# Patient Record
Sex: FEMALE | Race: WHITE | NOT HISPANIC OR LATINO | URBAN - METROPOLITAN AREA
[De-identification: names, ages, dates, MRNs, and addresses within clinical notes are randomized per-mention and may not be internally consistent; named-entity substitution may affect disease eponyms.]

---

## 2017-12-08 ENCOUNTER — CARE COORDINATION (OUTPATIENT)
Dept: PEDIATRIC CARDIOLOGY | Facility: CLINIC | Age: 22
End: 2017-12-08

## 2017-12-08 ENCOUNTER — MEDICAL CORRESPONDENCE (OUTPATIENT)
Dept: HEALTH INFORMATION MANAGEMENT | Facility: CLINIC | Age: 22
End: 2017-12-08

## 2017-12-08 DIAGNOSIS — I49.9 IRREGULAR HEARTBEAT: Primary | ICD-10-CM

## 2017-12-08 DIAGNOSIS — R63.4 WEIGHT LOSS: ICD-10-CM

## 2017-12-08 DIAGNOSIS — F50.9 EATING DISORDER: ICD-10-CM

## 2017-12-08 NOTE — PROGRESS NOTES
Dr. Rafia Rowland's assistant was calling to see if there were results on Anisha's outpt EKG done earlier this AM for irregular heart beat, weight loss and eating disorder.    This EKG was sent to Dr. Roman who is aware of her situation and said the EKG was normal.  No cardiology f/u needed on the EKG.    This information was given to the assistant. The official EKG will be faxed when it is entered into our system on Monday.  She verbalized understanding and has the nurse triage line if there are any other questions or concerns.    Ember Gamboa, RN Care Coordinator  [facility]

## 2017-12-09 LAB — INTERPRETATION ECG - MUSE: NORMAL

## 2017-12-21 DIAGNOSIS — R63.4 LOSS OF WEIGHT: ICD-10-CM

## 2017-12-21 DIAGNOSIS — R63.0 ANOREXIA: Primary | ICD-10-CM

## 2017-12-21 LAB — INTERPRETATION ECG - MUSE: NORMAL

## 2017-12-29 ENCOUNTER — HOSPITAL ENCOUNTER (EMERGENCY)
Facility: CLINIC | Age: 22
Discharge: HOME | End: 2017-12-29
Attending: EMERGENCY MEDICINE | Admitting: EMERGENCY MEDICINE
Payer: COMMERCIAL

## 2017-12-29 VITALS
SYSTOLIC BLOOD PRESSURE: 115 MMHG | HEART RATE: 67 BPM | DIASTOLIC BLOOD PRESSURE: 66 MMHG | WEIGHT: 123.46 LBS | OXYGEN SATURATION: 100 % | RESPIRATION RATE: 16 BRPM | TEMPERATURE: 98.2 F

## 2017-12-29 DIAGNOSIS — K21.9 GASTROESOPHAGEAL REFLUX DISEASE WITHOUT ESOPHAGITIS: ICD-10-CM

## 2017-12-29 LAB
ANION GAP SERPL CALCULATED.3IONS-SCNC: 6 MMOL/L (ref 3–14)
BASOPHILS # BLD AUTO: 0 10E9/L (ref 0–0.2)
BASOPHILS NFR BLD AUTO: 0.6 %
BUN SERPL-MCNC: 18 MG/DL (ref 7–30)
CALCIUM SERPL-MCNC: 9 MG/DL (ref 8.5–10.1)
CHLORIDE SERPL-SCNC: 105 MMOL/L (ref 94–109)
CO2 SERPL-SCNC: 29 MMOL/L (ref 20–32)
CREAT SERPL-MCNC: 1.02 MG/DL (ref 0.52–1.04)
DIFFERENTIAL METHOD BLD: NORMAL
EOSINOPHIL # BLD AUTO: 0.1 10E9/L (ref 0–0.7)
EOSINOPHIL NFR BLD AUTO: 2.1 %
ERYTHROCYTE [DISTWIDTH] IN BLOOD BY AUTOMATED COUNT: 13.7 % (ref 10–15)
GFR SERPL CREATININE-BSD FRML MDRD: 69 ML/MIN/1.7M2
GLUCOSE SERPL-MCNC: 85 MG/DL (ref 70–99)
HCT VFR BLD AUTO: 38.8 % (ref 35–47)
HGB BLD-MCNC: 12.6 G/DL (ref 11.7–15.7)
IMM GRANULOCYTES # BLD: 0 10E9/L (ref 0–0.4)
IMM GRANULOCYTES NFR BLD: 0.2 %
LYMPHOCYTES # BLD AUTO: 2.4 10E9/L (ref 0.8–5.3)
LYMPHOCYTES NFR BLD AUTO: 36 %
MAGNESIUM SERPL-MCNC: 2.2 MG/DL (ref 1.6–2.3)
MCH RBC QN AUTO: 29.8 PG (ref 26.5–33)
MCHC RBC AUTO-ENTMCNC: 32.5 G/DL (ref 31.5–36.5)
MCV RBC AUTO: 92 FL (ref 78–100)
MONOCYTES # BLD AUTO: 0.4 10E9/L (ref 0–1.3)
MONOCYTES NFR BLD AUTO: 5.7 %
NEUTROPHILS # BLD AUTO: 3.7 10E9/L (ref 1.6–8.3)
NEUTROPHILS NFR BLD AUTO: 55.4 %
NRBC # BLD AUTO: 0 10*3/UL
NRBC BLD AUTO-RTO: 0 /100
PHOSPHATE SERPL-MCNC: 3.5 MG/DL (ref 2.5–4.5)
PLATELET # BLD AUTO: 246 10E9/L (ref 150–450)
POTASSIUM SERPL-SCNC: 3.8 MMOL/L (ref 3.4–5.3)
RBC # BLD AUTO: 4.23 10E12/L (ref 3.8–5.2)
SODIUM SERPL-SCNC: 140 MMOL/L (ref 133–144)
TROPONIN I SERPL-MCNC: <0.015 UG/L (ref 0–0.04)
WBC # BLD AUTO: 6.6 10E9/L (ref 4–11)

## 2017-12-29 PROCEDURE — 99284 EMERGENCY DEPT VISIT MOD MDM: CPT | Mod: 25 | Performed by: EMERGENCY MEDICINE

## 2017-12-29 PROCEDURE — 83735 ASSAY OF MAGNESIUM: CPT | Performed by: EMERGENCY MEDICINE

## 2017-12-29 PROCEDURE — 25000125 ZZHC RX 250: Performed by: EMERGENCY MEDICINE

## 2017-12-29 PROCEDURE — 84100 ASSAY OF PHOSPHORUS: CPT | Performed by: EMERGENCY MEDICINE

## 2017-12-29 PROCEDURE — 93010 ELECTROCARDIOGRAM REPORT: CPT | Mod: Z6 | Performed by: EMERGENCY MEDICINE

## 2017-12-29 PROCEDURE — 99284 EMERGENCY DEPT VISIT MOD MDM: CPT | Performed by: EMERGENCY MEDICINE

## 2017-12-29 PROCEDURE — 25000132 ZZH RX MED GY IP 250 OP 250 PS 637: Performed by: EMERGENCY MEDICINE

## 2017-12-29 PROCEDURE — 84484 ASSAY OF TROPONIN QUANT: CPT | Performed by: EMERGENCY MEDICINE

## 2017-12-29 PROCEDURE — 80048 BASIC METABOLIC PNL TOTAL CA: CPT | Performed by: EMERGENCY MEDICINE

## 2017-12-29 PROCEDURE — 85025 COMPLETE CBC W/AUTO DIFF WBC: CPT | Performed by: EMERGENCY MEDICINE

## 2017-12-29 PROCEDURE — 93005 ELECTROCARDIOGRAM TRACING: CPT | Performed by: EMERGENCY MEDICINE

## 2017-12-29 RX ORDER — PANTOPRAZOLE SODIUM 40 MG/1
40 TABLET, DELAYED RELEASE ORAL DAILY
Qty: 30 TABLET | Refills: 0 | Status: SHIPPED | OUTPATIENT
Start: 2017-12-29 | End: 2018-01-28

## 2017-12-29 RX ADMIN — LIDOCAINE HYDROCHLORIDE 30 ML: 20 SOLUTION ORAL; TOPICAL at 22:34

## 2017-12-29 ASSESSMENT — ENCOUNTER SYMPTOMS
FEVER: 0
ABDOMINAL PAIN: 0
SHORTNESS OF BREATH: 0

## 2017-12-29 NOTE — ED AVS SNAPSHOT
[facility], Emergency Department    [address] [facility] [place] [address]    Phone:  [phone]                                       Anisha Garcia   MRN: [id number]    Department:  [facility], Emergency Department   Date of Visit:  12/29/2017           Patient Information     Date Of Birth          1995        Your diagnoses for this visit were:     Gastroesophageal reflux disease without esophagitis        You were seen by Ender Sanchez MD.        Discharge Instructions       Please make an appointment to follow up with Your Primary Care Provider as soon as possible if you have any concerns.  Results for orders placed or performed during the hospital encounter of 12/29/17   Basic metabolic panel   Result Value Ref Range    Sodium 140 133 - 144 mmol/L    Potassium 3.8 3.4 - 5.3 mmol/L    Chloride 105 94 - 109 mmol/L    Carbon Dioxide 29 20 - 32 mmol/L    Anion Gap 6 3 - 14 mmol/L    Glucose 85 70 - 99 mg/dL    Urea Nitrogen 18 7 - 30 mg/dL    Creatinine 1.02 0.52 - 1.04 mg/dL    GFR Estimate 69 >60 mL/min/1.7m2    GFR Estimate If Black 83 >60 mL/min/1.7m2    Calcium 9.0 8.5 - 10.1 mg/dL   CBC with platelets differential   Result Value Ref Range    WBC 6.6 4.0 - 11.0 10e9/L    RBC Count 4.23 3.8 - 5.2 10e12/L    Hemoglobin 12.6 11.7 - 15.7 g/dL    Hematocrit 38.8 35.0 - 47.0 %    MCV 92 78 - 100 fl    MCH 29.8 26.5 - 33.0 pg    MCHC 32.5 31.5 - 36.5 g/dL    RDW 13.7 10.0 - 15.0 %    Platelet Count 246 150 - 450 10e9/L    Diff Method Automated Method     % Neutrophils 55.4 %    % Lymphocytes 36.0 %    % Monocytes 5.7 %    % Eosinophils 2.1 %    % Basophils 0.6 %    % Immature Granulocytes 0.2 %    Nucleated RBCs 0 0 /100    Absolute Neutrophil 3.7 1.6 - 8.3 10e9/L    Absolute Lymphocytes 2.4 0.8 - 5.3 10e9/L    Absolute Monocytes 0.4 0.0 - 1.3 10e9/L    Absolute Eosinophils 0.1 0.0 - 0.7 10e9/L    Absolute Basophils 0.0 0.0 - 0.2 10e9/L    Abs Immature Granulocytes 0.0 0 - 0.4 10e9/L     Absolute Nucleated RBC 0.0    Magnesium   Result Value Ref Range    Magnesium 2.2 1.6 - 2.3 mg/dL   Phosphorus   Result Value Ref Range    Phosphorus 3.5 2.5 - 4.5 mg/dL   Troponin I   Result Value Ref Range    Troponin I ES <0.015 0.000 - 0.045 ug/L         GERD (Adult)    The esophagus is a tube that carries food from the mouth to the stomach. A valve at the lower end of the esophagus prevents stomach acid from flowing upward. When this valve doesn't work properly, stomach contents may repeatedly flow back up (reflux) into the esophagus. This is called gastroesophageal reflux disease (GERD). GERD can irritate the esophagus. It can cause problems with swallowing or breathing. In severe cases, GERD can cause recurrent pneumonia or other serious problems.  Symptoms of reflux include burning, pressure or sharp pain in the upper abdomen or mid to lower chest. The pain can spread to the neck, back, or shoulder. There may be belching, an acid taste in the back of the throat, chronic cough, or sore throat or hoarseness. GERD symptoms often occur during the day after a big meal. They can also occur at night when lying down.   Home care  Lifestyle changes can help reduce symptoms. If needed, medicines may be prescribed. Symptoms often improve with treatment, but if treatment is stopped, the symptoms often return after a few months. So most persons with GERD will need to continue treatment.  Lifestyle changes    Limit or avoid fatty, fried, and spicy foods, as well as coffee, chocolate, mint, and foods with high acid content such as tomatoes and citrus fruit and juices (orange, grapefruit, lemon).    Don t eat large meals, especially at night. Frequent, smaller meals are best. Do not lie down right after eating. And don t eat anything 3 hours before going to bed.    Avoid drinking alcohol and smoking. As much as possible, stay away from second hand smoke.    If you are overweight, losing weight will reduce  "symptoms.     Avoid wearing tight clothing around your stomach area.    If your symptoms occur during sleep, use a foam wedge to elevate your upper body (not just your head.) Or, place 4\" blocks under the head of your bed.  Medicines  If needed, medicines can help relieve the symptoms of GERD and prevent damage to the esophagus. Discuss a medicine plan with your healthcare provider. This may include one or more of the following medicines:    Antacids to help neutralize the normal acids in your stomach.    Acid blockers (H2 blockers) to decrease acid production.    Acid inhibitors (PPIs) to decrease acid production in a different way than the blockers. They may work better, but can take a little longer to take effect.  Take an antacid 30-60 minutes after eating and at bedtime, but not at the same time as an acid blocker.  Try not to take medicines such as ibuprofen and aspirin. If you are taking aspirin for your heart or other medical reasons, talk to your healthcare provider about stopping it.  Follow-up care  Follow up with your healthcare provider or as advised by our staff.  When to seek medical advice  Call your healthcare provider if any of the following occur:    Stomach pain gets worse or moves to the lower right abdomen (appendix area)    Chest pain appears or gets worse, or spreads to the back, neck, shoulder, or arm    Frequent vomiting (can t keep down liquids)    Blood in the stool or vomit (red or black in color)    Feeling weak or dizzy    Fever of 100.4 F (38 C) or higher, or as directed by your healthcare provider  Date Last Reviewed: 6/23/2015 2000-2017 [employer]. [address] [place] [address], [place] [address]. All rights reserved. This information is not intended as a substitute for professional medical care. Always follow your healthcare professional's instructions.          24 Hour Appointment Hotline       To make an appointment at any [facility], call [phone] ([phone]). If you don't have a family doctor or clinic, we will help you find one. [facility] are conveniently located to serve the needs of you and your family.             Review of your medicines      START taking        Dose / Directions Last dose taken    pantoprazole 40 MG EC tablet   Commonly known as:  PROTONIX   Dose:  40 mg   Quantity:  30 tablet        Take 1 tablet (40 mg) by mouth daily for 30 doses   Refills:  0          Our records show that you are taking the medicines listed below. If these are incorrect, please call your family doctor or clinic.        Dose / Directions Last dose taken    albuterol 90 MCG/ACT inhaler   Dose:  2 puff        Inhale 2 puffs into the lungs every 6 hours as needed.   Refills:  0        BUSPAR PO   Dose:  5 mg        Take 5 mg by mouth 2 times daily   Refills:  0        DESOGEN 0.15-30 MG-MCG per tablet   Dose:  1 tablet   Generic drug:  desogestrel-ethinyl estradiol        Take 1 tablet by mouth daily.   Refills:  0        MIRTAZAPINE PO   Dose:  15 mg        Take 15 mg by mouth At Bedtime   Refills:  0        WELLBUTRIN XL PO   Dose:  300 mg        Take 300 mg by mouth daily   Refills:  0                Prescriptions were sent or printed at these locations (1 Prescription)                   Other Prescriptions                Printed at Department/Unit printer (1 of 1)         pantoprazole (PROTONIX) 40 MG EC tablet                Procedures and tests performed during your visit     Basic metabolic panel    CBC with platelets differential    EKG 12 lead    Magnesium    Phosphorus    Troponin I      Orders Needing Specimen Collection     None      Pending Results     No orders found from 12/27/2017 to 12/30/2017.            Pending Culture Results     No orders found from 12/27/2017 to 12/30/2017.            Pending Results Instructions     If you had any lab results that were not finalized at the time of your Discharge, you can call the ED Lab Result RN at  "[phone]. You will be contacted by this team for any positive Lab results or changes in treatment. The nurses are available 7 days a week from 10A to 6:30P.  You can leave a message 24 hours per day and they will return your call.        Thank you for choosing [facility]       Thank you for choosing [facility] for your care. Our goal is always to provide you with excellent care. Hearing back from our patients is one way we can continue to improve our services. Please take a few minutes to complete the written survey that you may receive in the mail after you visit with us. Thank you!        [employer] Information     [employer] lets you send messages to your doctor, view your test results, renew your prescriptions, schedule appointments and more. To sign up, go to [email or web address] . Click on \"Log in\" on the left side of the screen, which will take you to the Welcome page. Then click on \"Sign up Now\" on the right side of the page.     You will be asked to enter the access code listed below, as well as some personal information. Please follow the directions to create your username and password.     Your access code is: [email or web address]  Expires: 3/29/2018 11:14 PM     Your access code will  in 90 days. If you need help or a new code, please call your [facility] clinic or [phone].        Care EveryWhere ID     This is your Care EveryWhere ID. This could be used by other organizations to access your [facility] medical records  [id number]        Equal Access to Services     CORDELIA RUSH : Hadii heidi sutherlando Sofranko, waaxda luqadaha, qaybta kaalmada sadia, angely mera . So [facility] [phone].    ATENCIÓN: Si habla español, tiene a greer disposición servicios gratuitos de asistencia lingüística. Llame al [phone].    We comply with applicable federal civil rights laws and [place] laws. We do not discriminate on the basis of race, color, national origin, age, disability, sex, " sexual orientation, or gender identity.            After Visit Summary       This is your record. Keep this with you and show to your community pharmacist(s) and doctor(s) at your next visit.

## 2017-12-29 NOTE — ED AVS SNAPSHOT
[facility], [place], Emergency Department    [address] [place] [address]    Phone:  [phone]                                       Anisha Garcia   MRN: [id number]    Department:  [facility], Emergency Department   Date of Visit:  12/29/2017           After Visit Summary Signature Page     I have received my discharge instructions, and my questions have been answered. I have discussed any challenges I see with this plan with the nurse or doctor.    ..........................................................................................................................................  Patient/Patient Representative Signature      ..........................................................................................................................................  Patient Representative Print Name and Relationship to Patient    ..................................................               ................................................  Date                                            Time    ..........................................................................................................................................  Reviewed by Signature/Title    ...................................................              ..............................................  Date                                                            Time

## 2017-12-30 NOTE — ED NOTES
Patient arrived from the [facility] for chest pain she has had since yesterday. Pt denies any history of cardiac problems.

## 2017-12-30 NOTE — DISCHARGE INSTRUCTIONS
Please make an appointment to follow up with Your Primary Care Provider as soon as possible if you have any concerns.  Results for orders placed or performed during the hospital encounter of 12/29/17   Basic metabolic panel   Result Value Ref Range    Sodium 140 133 - 144 mmol/L    Potassium 3.8 3.4 - 5.3 mmol/L    Chloride 105 94 - 109 mmol/L    Carbon Dioxide 29 20 - 32 mmol/L    Anion Gap 6 3 - 14 mmol/L    Glucose 85 70 - 99 mg/dL    Urea Nitrogen 18 7 - 30 mg/dL    Creatinine 1.02 0.52 - 1.04 mg/dL    GFR Estimate 69 >60 mL/min/1.7m2    GFR Estimate If Black 83 >60 mL/min/1.7m2    Calcium 9.0 8.5 - 10.1 mg/dL   CBC with platelets differential   Result Value Ref Range    WBC 6.6 4.0 - 11.0 10e9/L    RBC Count 4.23 3.8 - 5.2 10e12/L    Hemoglobin 12.6 11.7 - 15.7 g/dL    Hematocrit 38.8 35.0 - 47.0 %    MCV 92 78 - 100 fl    MCH 29.8 26.5 - 33.0 pg    MCHC 32.5 31.5 - 36.5 g/dL    RDW 13.7 10.0 - 15.0 %    Platelet Count 246 150 - 450 10e9/L    Diff Method Automated Method     % Neutrophils 55.4 %    % Lymphocytes 36.0 %    % Monocytes 5.7 %    % Eosinophils 2.1 %    % Basophils 0.6 %    % Immature Granulocytes 0.2 %    Nucleated RBCs 0 0 /100    Absolute Neutrophil 3.7 1.6 - 8.3 10e9/L    Absolute Lymphocytes 2.4 0.8 - 5.3 10e9/L    Absolute Monocytes 0.4 0.0 - 1.3 10e9/L    Absolute Eosinophils 0.1 0.0 - 0.7 10e9/L    Absolute Basophils 0.0 0.0 - 0.2 10e9/L    Abs Immature Granulocytes 0.0 0 - 0.4 10e9/L    Absolute Nucleated RBC 0.0    Magnesium   Result Value Ref Range    Magnesium 2.2 1.6 - 2.3 mg/dL   Phosphorus   Result Value Ref Range    Phosphorus 3.5 2.5 - 4.5 mg/dL   Troponin I   Result Value Ref Range    Troponin I ES <0.015 0.000 - 0.045 ug/L         GERD (Adult)    The esophagus is a tube that carries food from the mouth to the stomach. A valve at the lower end of the esophagus prevents stomach acid from flowing upward. When this valve doesn't work properly, stomach contents may repeatedly flow back  "up (reflux) into the esophagus. This is called gastroesophageal reflux disease (GERD). GERD can irritate the esophagus. It can cause problems with swallowing or breathing. In severe cases, GERD can cause recurrent pneumonia or other serious problems.  Symptoms of reflux include burning, pressure or sharp pain in the upper abdomen or mid to lower chest. The pain can spread to the neck, back, or shoulder. There may be belching, an acid taste in the back of the throat, chronic cough, or sore throat or hoarseness. GERD symptoms often occur during the day after a big meal. They can also occur at night when lying down.   Home care  Lifestyle changes can help reduce symptoms. If needed, medicines may be prescribed. Symptoms often improve with treatment, but if treatment is stopped, the symptoms often return after a few months. So most persons with GERD will need to continue treatment.  Lifestyle changes    Limit or avoid fatty, fried, and spicy foods, as well as coffee, chocolate, mint, and foods with high acid content such as tomatoes and citrus fruit and juices (orange, grapefruit, lemon).    Don t eat large meals, especially at night. Frequent, smaller meals are best. Do not lie down right after eating. And don t eat anything 3 hours before going to bed.    Avoid drinking alcohol and smoking. As much as possible, stay away from second hand smoke.    If you are overweight, losing weight will reduce symptoms.     Avoid wearing tight clothing around your stomach area.    If your symptoms occur during sleep, use a foam wedge to elevate your upper body (not just your head.) Or, place 4\" blocks under the head of your bed.  Medicines  If needed, medicines can help relieve the symptoms of GERD and prevent damage to the esophagus. Discuss a medicine plan with your healthcare provider. This may include one or more of the following medicines:    Antacids to help neutralize the normal acids in your stomach.    Acid blockers (H2 " blockers) to decrease acid production.    Acid inhibitors (PPIs) to decrease acid production in a different way than the blockers. They may work better, but can take a little longer to take effect.  Take an antacid 30-60 minutes after eating and at bedtime, but not at the same time as an acid blocker.  Try not to take medicines such as ibuprofen and aspirin. If you are taking aspirin for your heart or other medical reasons, talk to your healthcare provider about stopping it.  Follow-up care  Follow up with your healthcare provider or as advised by our staff.  When to seek medical advice  Call your healthcare provider if any of the following occur:    Stomach pain gets worse or moves to the lower right abdomen (appendix area)    Chest pain appears or gets worse, or spreads to the back, neck, shoulder, or arm    Frequent vomiting (can t keep down liquids)    Blood in the stool or vomit (red or black in color)    Feeling weak or dizzy    Fever of 100.4 F (38 C) or higher, or as directed by your healthcare provider  Date Last Reviewed: 6/23/2015 2000-2017 [employer]. [address] [place] [address], [place] [address]. All rights reserved. This information is not intended as a substitute for professional medical care. Always follow your healthcare professional's instructions.

## 2017-12-31 LAB — INTERPRETATION ECG - MUSE: NORMAL

## 2018-01-18 ENCOUNTER — RECORDS - HEALTHEAST (OUTPATIENT)
Dept: LAB | Facility: CLINIC | Age: 23
End: 2018-01-18

## 2018-01-18 LAB
FERRITIN SERPL-MCNC: 31 NG/ML (ref 10–130)
MAGNESIUM SERPL-MCNC: 2.2 MG/DL (ref 1.8–2.6)
PHOSPHATE SERPL-MCNC: 4 MG/DL (ref 2.5–4.5)

## 2018-11-16 ENCOUNTER — HOSPITAL ENCOUNTER (OUTPATIENT)
Dept: NUTRITION | Facility: CLINIC | Age: 23
Discharge: HOME | End: 2018-11-16
Admitting: PEDIATRICS
Payer: COMMERCIAL

## 2018-11-16 VITALS — WEIGHT: 153.8 LBS

## 2018-11-16 PROCEDURE — 97802 MEDICAL NUTRITION INDIV IN: CPT | Performed by: DIETITIAN, REGISTERED

## 2018-11-16 NOTE — PROGRESS NOTES
"OUTPATIENT NUTRITION ASSESSMENT  REASON FOR ASSESSMENT  Anisha Garcia referred by Dr. Rowland for MNT related to bradycardia, orthostatic hypotension.    Patient accompanied by self      ASSESSMENT   Nutrition History: - Information obtained from patient.  Patient has struggled with eating disorder for past 2 years.  Patient states she restricted as a way to slowly die.  Patient states she does not have any SI thoughts.  Patient denies laxatives, diet pills and diuretics.  Patient's compensatory behaviors include restriction and previously over exercise 30 minutes 3 times per day most days.  Patient does not typically weigh self but was giving plasma yesterday and was told her weight was 161 lbs.  Patient did have reaction to hearing her weight.  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  Patient does not feel it is triggering to work at restaurant.       Diet Recall:  Breakfast: 2 waffles with peanut butter, nutella and syrup and whipped cream + milk  Snack: may skip or 1/2 bagel with cream cheese, salmon and spinach  Lunch: bagel with cream cheese, turkey and onions   Snack: may skip or Jeremy bar and canned peaches  Dinner: pork chop with brussel sprout salad and waffle  Snack: usually skips   Snack: 1/2 bag  Beverages: water, coffee  Dining out: May dine out with friends or eats at restaurant where she works         Exercise: Patient does not currently exercise.  Patient renewed gym membership.  Patient states she does not have exercise restriction anymore.  RD called Dr. Rowland's office and spoke with Milena Cancino CMA to clarify if patient on exercise restriction.  NICK states there are no notes stating exercise restriction.     PHYSICAL FINDINGS  Observed:  No nutrition-related physical findings observed  Obtained from Chart/Interdisciplinary Team:  None noted    LABS  N/A    MEDICATIONS  Medications reviewed    ANTHROPOMETRICS   Height: 5'8\"  Weight: " 153.8 lbs  BMI (kg/m2): 23.3 kg/m2   Weight Status:  Normal BMI  %IBW: 109%  Weight History: Patient states her weight range goal is 150-160 lbs.  Wt Readings from Last 5 Encounters:   11/16/18 69.8 kg (153 lb 12.8 oz)   12/29/17 56 kg (123 lb 7.3 oz)   10/15/12 60.5 kg (133 lb 6.1 oz) (76 %)*     * Growth percentiles are based on [facility] 2-20 Years data.     ASSESSED NUTRITION NEEDS  Estimated Energy Needs: 7184-3218 kcals/day (25-30 Kcal/Kg)  Justification:  (maintenance)  Estimated Protein Needs: 70-83 grams protein/day (1-1.2 g pro/Kg)  Justification:  (maintenance)  Estimated Fluid Needs: 9570-9011 mL/day (30-35 mL/kg)    ASSESSED MALNUTRITION STATUS  % Weight Loss:  None noted  % Intake:  Decreased intake does not meet criteria for malnutrition   Subcutaneous Fat Loss:  None observed  Loss of Muscle Mass:  None observed  Fluid Retention:  None noted    Malnutrition Diagnosis:  Patient does not meet two of the above criteria necessary for diagnosing malnutrition    DIAGNOSIS   Nutrition Diagnosis:  Disordered eating pattern related to preoccupation with weight as evidenced by restricted eating behaviors and previous over exercising       INTERVENTIONS   Nutrition Prescription   Normalized eating pattern to include:  B: 2 grains, 1 fat, 1 milk, 1 fruit  Snack: 2-3 tallies  L: 2 grains, 1 vegetable, 1 fat, 1 milk, 3 protein  Snack: 2-3 tallies  D: 2 grains, 1 vegetable, 1 fat, 3 protein  Snack: 2-4 tallies      IMPLEMENTATION   Assessed learning needs and learning preference  Teaching Method(s) used: Explanation  Diet Education:  Provided education on normalized eating pattern  Nutrition Education (Content):   a)  Discussed meal plan and current eating behaviors.  Patient feels she eats better when she eats with others. Reviewed treatment goals from previous eating disorder.  Reviewed fluid intake with patient as states limits fluids and was hospitalized due to dehydration.  Supported patient with her struggle with  food and limited fluid intake.         Nutrition Education (Application):   a)  Discussed current eating plans and recommended ways to be consistent with eating patterns.     b)  Patient verbalizes understanding of diet by stating will increase fluids to 2 liters and set alarms to remind self to eat snacks.   Anticipate fair-good compliance   Other: Patient signed authorization to discuss protected health information form so RD can speak with therapist, Ami Huynh.       GOALS  2 liters water per day to prevent dehydration   Eat 3 snacks per day at 10 AM, 3 PM and 8 PM  Begin gradual exercise 2 times per week for 20 minutes     FOLLOW UP/MONITORING   Progress towards goals will be monitored and evaluated per protocol and Practice Guidelines  Patient to follow up with RD in 2 weeks  RD name and number provided     Time Spent with Patient  55 minutes    Karly Allen, RD, LD  [facility] Outpatient Dietitian  [phone] (office phone)

## 2018-11-30 ENCOUNTER — HOSPITAL ENCOUNTER (OUTPATIENT)
Dept: NUTRITION | Facility: CLINIC | Age: 23
End: 2018-11-30
Attending: DIETITIAN, REGISTERED
Payer: COMMERCIAL

## 2018-11-30 PROCEDURE — 97803 MED NUTRITION INDIV SUBSEQ: CPT | Performed by: DIETITIAN, REGISTERED

## 2018-11-30 NOTE — PROGRESS NOTES
OUTPATIENT NUTRITION REASSESSMENT  REASON FOR ASSESSMENT  Anisha Garcia referred by Dr. Rowland for MNT related to bradycardia, orthostatic hypotension.    Patient accompanied by self.      ASSESSMENT   Nutrition History: - Information obtained from patient.  Patient has struggled with eating disorder for past 2 years.  Patient states she restricted as a way to slowly die.  Patient states she does not have any SI thoughts.  Patient denies laxatives, diet pills and diuretics.  Patient's compensatory behaviors include restriction and previously overexercise 30 minutes 3 times per day most days.  Patient does not typically weigh self but was giving plasma yesterday and was told her weight was 161 lbs.  Patient did have reaction to hearing her weight.  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  Patient does not feel it is triggering to work at restaurant.       Diet Recall:  Breakfast: 2 waffles with peanut butter, nutella and syrup and whipped cream + milk  Snack: may skip or 1/2 bagel with cream cheese, salmon and spinach  Lunch: bagel with cream cheese, turkey and onions   Snack: may skip or Jeremy bar and canned peaches  Dinner: pork chop with brussel sprout salad and waffle  Snack: usually skips or oatmeal, chips or granola bar   Snack: 1/2 bag  Beverages: water, coffee  Dining out: May dine out with friends or eats at restaurant where she works         Exercise: Patient tried exercising and kept to exercise limits.  Patient was able limit exercise when she did not eat snack prior to going to gym.      Patient renewed gym membership.  Patient states she does not have exercise restriction anymore.  RD called Dr. Rowland's office and spoke with Milena Cancino CMA to clarify if patient on exercise restriction.  NICK states there are no notes stating exercise restriction.     PHYSICAL FINDINGS  Observed:  No nutrition-related physical findings observed  Obtained  "from Chart/Interdisciplinary Team:  None noted    LABS  N/A    MEDICATIONS  Medications reviewed    ANTHROPOMETRICS   Height: 5'8\"  Weight: 153.8 lbs  BMI (kg/m2): 23.3 kg/m2   Weight Status:  Normal BMI  %IBW: 109%  Weight History: Patient states her weight range goal is 150-160 lbs.  Wt Readings from Last 5 Encounters:   11/16/18 69.8 kg (153 lb 12.8 oz)   12/29/17 56 kg (123 lb 7.3 oz)   10/15/12 60.5 kg (133 lb 6.1 oz) (76 %)*     * Growth percentiles are based on CDC 2-20 Years data.     ASSESSED NUTRITION NEEDS  Estimated Energy Needs: 1679-3692 kcals/day (25-30 Kcal/Kg)  Justification:  (maintenance)  Estimated Protein Needs: 70-83 grams protein/day (1-1.2 g pro/Kg)  Justification:  (maintenance)  Estimated Fluid Needs: 8372-3705 mL/day (30-35 mL/kg)    EVALUATION/PROGRESS TOWARDS GOALS  Previous nutrition goals:  2 liters water per day to prevent dehydration-not met   Eat 3 snacks per day at 10 AM, 3 PM and 8 PM-not met    Begin gradual exercise 2 times per week for 20 minutes-improving    Previous nutrition diagnosis: Disordered eating pattern related to preoccupation with weight as evidenced by restricted eating behaviors and previous over exercising-not change      Current nutrition diagnosis  Disordered eating pattern related to preoccupation with weight as evidenced by restricted eating behaviors and previous over exercising       INTERVENTIONS   Nutrition Prescription   Normalized eating pattern to include:  B: 2 grains, 1 fat, 1 milk, 1 fruit  Snack: 2-3 tallies  L: 2 grains, 1 vegetable, 1 fat, 1 milk, 3 protein  Snack: 2-3 tallies  D: 2 grains, 1 vegetable, 1 fat, 3 protein  Snack: 2-4 tallies      IMPLEMENTATION   Assessed learning needs and learning preference  Teaching Method(s) used: Explanation  Diet Education:  Provided education on normalized eating pattern  Nutrition Education (Content):   a)  Discussed meal plan and current eating behaviors.  Congratulated patient on eating b-day " celebration dinner with boyfriend's family.  Patient feels she eats better when she eats with others. Reviewed treatment goals from previous eating disorder.  Supported patient with her struggle with food and limited fluid intake.         Nutrition Education (Application):   a)  Discussed current eating plans and problem solved food options when dining out for dinner tonight.  Also problem solved how to get more fluids in during the day and ways to increase snacks.       b)  Patient verbalizes understanding of diet by stating will put snacks in school bag this weekend.    Anticipate fair-good compliance   Other: Patient signed authorization to discuss protected health information form so RD can speak with therapist, Ami Huynh.       GOALS  2 liters water per day to prevent dehydration   Eat 3 snacks per day at 10 AM, 3 PM and 8 PM by packing snacks to bring with during the day   Begin gradual exercise 2 times per week for 20 minutes     FOLLOW UP/MONITORING   Progress towards goals will be monitored and evaluated per protocol and Practice Guidelines  Patient to follow up with RD in 2 weeks  RD name and number provided     Time Spent with Patient  25 minutes    Karly Allen, JEFFERSON, LD  [facility] Outpatient Dietitian  [phone] (office phone)

## 2018-12-27 ENCOUNTER — RECORDS - HEALTHEAST (OUTPATIENT)
Dept: LAB | Facility: CLINIC | Age: 23
End: 2018-12-27

## 2018-12-28 LAB
C TRACH DNA SPEC QL PROBE+SIG AMP: NEGATIVE
N GONORRHOEA DNA SPEC QL NAA+PROBE: NEGATIVE

## 2019-04-01 ENCOUNTER — RECORDS - HEALTHEAST (OUTPATIENT)
Dept: LAB | Facility: CLINIC | Age: 24
End: 2019-04-01

## 2019-04-02 LAB
C TRACH DNA SPEC QL PROBE+SIG AMP: NEGATIVE
N GONORRHOEA DNA SPEC QL NAA+PROBE: NEGATIVE

## 2019-10-30 ENCOUNTER — RECORDS - HEALTHEAST (OUTPATIENT)
Dept: ADMINISTRATIVE | Facility: OTHER | Age: 24
End: 2019-10-30

## 2020-03-02 ENCOUNTER — HEALTH MAINTENANCE LETTER (OUTPATIENT)
Age: 25
End: 2020-03-02

## 2020-09-29 ENCOUNTER — RECORDS - HEALTHEAST (OUTPATIENT)
Dept: LAB | Facility: CLINIC | Age: 25
End: 2020-09-29

## 2020-09-30 LAB
C TRACH DNA SPEC QL PROBE+SIG AMP: NEGATIVE
N GONORRHOEA DNA SPEC QL NAA+PROBE: NEGATIVE

## 2020-12-14 ENCOUNTER — HEALTH MAINTENANCE LETTER (OUTPATIENT)
Age: 25
End: 2020-12-14

## 2020-12-17 ENCOUNTER — RECORDS - HEALTHEAST (OUTPATIENT)
Dept: LAB | Facility: CLINIC | Age: 25
End: 2020-12-17

## 2020-12-19 LAB — BACTERIA SPEC CULT: NO GROWTH

## 2020-12-22 LAB
C TRACH DNA SPEC QL PROBE+SIG AMP: NEGATIVE
N GONORRHOEA DNA SPEC QL NAA+PROBE: NEGATIVE

## 2021-04-18 ENCOUNTER — HEALTH MAINTENANCE LETTER (OUTPATIENT)
Age: 26
End: 2021-04-18

## 2021-05-26 ENCOUNTER — RECORDS - HEALTHEAST (OUTPATIENT)
Dept: ADMINISTRATIVE | Facility: CLINIC | Age: 26
End: 2021-05-26

## 2021-05-29 ENCOUNTER — RECORDS - HEALTHEAST (OUTPATIENT)
Dept: ADMINISTRATIVE | Facility: CLINIC | Age: 26
End: 2021-05-29

## 2021-05-30 ENCOUNTER — RECORDS - HEALTHEAST (OUTPATIENT)
Dept: ADMINISTRATIVE | Facility: CLINIC | Age: 26
End: 2021-05-30

## 2021-10-02 ENCOUNTER — HEALTH MAINTENANCE LETTER (OUTPATIENT)
Age: 26
End: 2021-10-02

## 2022-05-14 ENCOUNTER — HEALTH MAINTENANCE LETTER (OUTPATIENT)
Age: 27
End: 2022-05-14

## 2022-09-03 ENCOUNTER — HEALTH MAINTENANCE LETTER (OUTPATIENT)
Age: 27
End: 2022-09-03

## 2023-04-14 ENCOUNTER — LAB REQUISITION (OUTPATIENT)
Dept: LAB | Facility: CLINIC | Age: 28
End: 2023-04-14
Payer: COMMERCIAL

## 2023-04-14 DIAGNOSIS — R53.83 OTHER FATIGUE: ICD-10-CM

## 2023-04-14 LAB
ALBUMIN SERPL BCG-MCNC: 4.7 G/DL (ref 3.5–5.2)
ALP SERPL-CCNC: 39 U/L (ref 35–104)
ALT SERPL W P-5'-P-CCNC: 15 U/L (ref 10–35)
ANION GAP SERPL CALCULATED.3IONS-SCNC: 12 MMOL/L (ref 7–15)
AST SERPL W P-5'-P-CCNC: 15 U/L (ref 10–35)
BILIRUB SERPL-MCNC: 0.3 MG/DL
BUN SERPL-MCNC: 16.1 MG/DL (ref 6–20)
CALCIUM SERPL-MCNC: 10.1 MG/DL (ref 8.6–10)
CHLORIDE SERPL-SCNC: 103 MMOL/L (ref 98–107)
CREAT SERPL-MCNC: 0.78 MG/DL (ref 0.51–0.95)
CRP SERPL-MCNC: <3 MG/L
DEPRECATED HCO3 PLAS-SCNC: 22 MMOL/L (ref 22–29)
GFR SERPL CREATININE-BSD FRML MDRD: >90 ML/MIN/1.73M2
GLUCOSE SERPL-MCNC: 81 MG/DL (ref 70–99)
POTASSIUM SERPL-SCNC: 5 MMOL/L (ref 3.4–5.3)
PROT SERPL-MCNC: 7.4 G/DL (ref 6.4–8.3)
SODIUM SERPL-SCNC: 137 MMOL/L (ref 136–145)
TSH SERPL DL<=0.005 MIU/L-ACNC: 2.45 UIU/ML (ref 0.3–4.2)

## 2023-04-14 PROCEDURE — 86140 C-REACTIVE PROTEIN: CPT | Mod: ORL | Performed by: STUDENT IN AN ORGANIZED HEALTH CARE EDUCATION/TRAINING PROGRAM

## 2023-04-14 PROCEDURE — 82306 VITAMIN D 25 HYDROXY: CPT | Mod: ORL | Performed by: STUDENT IN AN ORGANIZED HEALTH CARE EDUCATION/TRAINING PROGRAM

## 2023-04-14 PROCEDURE — 80053 COMPREHEN METABOLIC PANEL: CPT | Mod: ORL | Performed by: STUDENT IN AN ORGANIZED HEALTH CARE EDUCATION/TRAINING PROGRAM

## 2023-04-14 PROCEDURE — 84443 ASSAY THYROID STIM HORMONE: CPT | Mod: ORL | Performed by: STUDENT IN AN ORGANIZED HEALTH CARE EDUCATION/TRAINING PROGRAM

## 2023-04-16 LAB — DEPRECATED CALCIDIOL+CALCIFEROL SERPL-MC: 41 UG/L (ref 20–75)

## 2023-06-03 ENCOUNTER — HEALTH MAINTENANCE LETTER (OUTPATIENT)
Age: 28
End: 2023-06-03

## 2023-06-06 ENCOUNTER — LAB REQUISITION (OUTPATIENT)
Dept: LAB | Facility: CLINIC | Age: 28
End: 2023-06-06

## 2023-06-06 DIAGNOSIS — J02.9 ACUTE PHARYNGITIS, UNSPECIFIED: ICD-10-CM

## 2023-06-06 PROCEDURE — 87081 CULTURE SCREEN ONLY: CPT | Performed by: PHYSICIAN ASSISTANT

## 2023-06-09 LAB — BACTERIA SPEC CULT: NORMAL

## 2023-11-05 ENCOUNTER — LAB REQUISITION (OUTPATIENT)
Dept: LAB | Facility: CLINIC | Age: 28
End: 2023-11-05

## 2023-11-05 DIAGNOSIS — R35.0 FREQUENCY OF MICTURITION: ICD-10-CM

## 2023-11-05 PROCEDURE — 87086 URINE CULTURE/COLONY COUNT: CPT | Performed by: FAMILY MEDICINE

## 2023-11-06 LAB — BACTERIA UR CULT: ABNORMAL

## 2024-01-17 NOTE — ED PROVIDER NOTES
History     Chief Complaint   Patient presents with     Chest Pain     HPI  Anisha Garcia is a 22 year old female with a history of anorexia who presents to the Emergency Department with her family from the [facility] for evaluation of chest pain.  The patient reports intermittent episodes of substernal chest pressure over the past 2 days.  She states the first episode was last night, about 1.5 hours after eating, when she was lying down to go to sleep.  She says the same chest pain occur this afternoon around 4 PM (6 hours ago), about 30 minutes after having a meal, and she states that the pain is mostly resolved now.  She has not had chest pain like this in the past.  Of note, the patient is inpatient at the [facility] for ongoing eating disorder treatment.  Her parents say that staff at the treatment facility were concerned about possible nutrition or dehydration and wanted her evaluated in the ER.    No past medical history on file.    No past surgical history on file.    No family history on file.    Social History   Substance Use Topics     Smoking status: Never Smoker     Smokeless tobacco: Not on file     Alcohol use Not on file       No current facility-administered medications for this encounter.      Current Outpatient Prescriptions   Medication     BusPIRone HCl (BUSPAR PO)     BuPROPion HCl (WELLBUTRIN XL PO)     MIRTAZAPINE PO     pantoprazole (PROTONIX) 40 MG EC tablet     desogestrel-ethinyl estradiol (DESOGEN) 0.15-30 MG-MCG per tablet     albuterol 90 MCG/ACT inhaler        Allergies   Allergen Reactions     Sulfa Drugs          I have reviewed the Medications, Allergies, Past Medical and Surgical History, and Social History in the Epic system.    Review of Systems   Constitutional: Negative for fever.   Respiratory: Negative for shortness of breath.    Cardiovascular: Positive for chest pain.   Gastrointestinal: Negative for abdominal pain.   All other systems reviewed and are  negative.      Physical Exam   BP: 115/66  Pulse: 67  Heart Rate: 67  Temp: 98.2  F (36.8  C)  Resp: 16  Weight: 56 kg (123 lb 7.3 oz)  SpO2: 100 %      Physical Exam   Constitutional: She is oriented to person, place, and time. She appears well-developed and well-nourished. No distress.   HENT:   Head: Normocephalic and atraumatic.   Eyes: No scleral icterus.   Neck: Normal range of motion. Neck supple.   Cardiovascular: Normal rate.    Pulmonary/Chest: Effort normal. No respiratory distress.   Abdominal: Soft. There is no tenderness.   Neurological: She is alert and oriented to person, place, and time.   Skin: Skin is warm and dry. No rash noted. She is not diaphoretic. No erythema. No pallor.       ED Course     ED Course     Procedures             EKG Interpretation:      Interpreted by Ender Sanchez  Time reviewed:   Symptoms at time of EKG: epigastric pain   Rhythm: normal sinus   Rate: normal  Axis: normal  Ectopy: none  Conduction: normal  ST Segments/ T Waves: No ST-T wave changes  Q Waves: none  Comparison to prior: No old EKG available    Clinical Impression: normal EKG            Critical Care time:  none           Labs Ordered and Resulted from Time of ED Arrival Up to the Time of Departure from the ED   BASIC METABOLIC PANEL   CBC WITH PLATELETS DIFFERENTIAL   MAGNESIUM   PHOSPHORUS   TROPONIN I     Results for orders placed or performed during the hospital encounter of 12/29/17   Basic metabolic panel   Result Value Ref Range    Sodium 140 133 - 144 mmol/L    Potassium 3.8 3.4 - 5.3 mmol/L    Chloride 105 94 - 109 mmol/L    Carbon Dioxide 29 20 - 32 mmol/L    Anion Gap 6 3 - 14 mmol/L    Glucose 85 70 - 99 mg/dL    Urea Nitrogen 18 7 - 30 mg/dL    Creatinine 1.02 0.52 - 1.04 mg/dL    GFR Estimate 69 >60 mL/min/1.7m2    GFR Estimate If Black 83 >60 mL/min/1.7m2    Calcium 9.0 8.5 - 10.1 mg/dL   CBC with platelets differential   Result Value Ref Range    WBC 6.6 4.0 - 11.0 10e9/L    RBC Count  4.23 3.8 - 5.2 10e12/L    Hemoglobin 12.6 11.7 - 15.7 g/dL    Hematocrit 38.8 35.0 - 47.0 %    MCV 92 78 - 100 fl    MCH 29.8 26.5 - 33.0 pg    MCHC 32.5 31.5 - 36.5 g/dL    RDW 13.7 10.0 - 15.0 %    Platelet Count 246 150 - 450 10e9/L    Diff Method Automated Method     % Neutrophils 55.4 %    % Lymphocytes 36.0 %    % Monocytes 5.7 %    % Eosinophils 2.1 %    % Basophils 0.6 %    % Immature Granulocytes 0.2 %    Nucleated RBCs 0 0 /100    Absolute Neutrophil 3.7 1.6 - 8.3 10e9/L    Absolute Lymphocytes 2.4 0.8 - 5.3 10e9/L    Absolute Monocytes 0.4 0.0 - 1.3 10e9/L    Absolute Eosinophils 0.1 0.0 - 0.7 10e9/L    Absolute Basophils 0.0 0.0 - 0.2 10e9/L    Abs Immature Granulocytes 0.0 0 - 0.4 10e9/L    Absolute Nucleated RBC 0.0    Magnesium   Result Value Ref Range    Magnesium 2.2 1.6 - 2.3 mg/dL   Phosphorus   Result Value Ref Range    Phosphorus 3.5 2.5 - 4.5 mg/dL   Troponin I   Result Value Ref Range    Troponin I ES <0.015 0.000 - 0.045 ug/L   EKG 12 lead   Result Value Ref Range    Interpretation ECG Click View Image link to view waveform and result            Medications   lidocaine (viscous) (XYLOCAINE) 2 % 15 mL, alum & mag hydroxide-simethicone (MYLANTA ES/MAALOX  ES) 15 mL GI Cocktail (30 mLs Oral Given 12/29/17 7288)       Assessments & Plan (with Medical Decision Making)   This is a 22-year-old female patient coming into emergency room with epigastric/chest pain.  Patient is a inpatient at the [facility] for eating disorders.  She is coming in with a mild case of epigastric/chest pain that was associated with eating.  The center was not initially concerned about her chest pain but does have a policy stating that if anyone has chest pain that they need to be seen at the emergency room for evaluation.  I did speak with the nurse on staff and they stated that her symptoms appeared to be mild and they were not very concerning to them but due to the policy she was sent in.  Her family is with her  at this time and is extremely anxious about the emergency department visit.  After answering many questions about appropriate workup we determined that basic EKG and laboratory results would be sufficient.  She was provided with a GI cocktail during her stay with complete resolution of her symptoms.  Her EKG was normal sinus rhythm without signs of ectopy or ischemia.  Her labs are completely unremarkable.  It appears that her symptoms are most likely associated with GERD as her chest pain was associated with eating.  She can be safely discharged with a prescription for Protonix and can follow-up with her primary care doctor for any further concerns.    I have reviewed the nursing notes.    I have reviewed the findings, diagnosis, plan and need for follow up with the patient.    Discharge Medication List as of 12/29/2017 11:14 PM      START taking these medications    Details   pantoprazole (PROTONIX) 40 MG EC tablet Take 1 tablet (40 mg) by mouth daily for 30 doses, Disp-30 tablet, R-0, Local Print             Final diagnoses:   Gastroesophageal reflux disease without esophagitis   I, Jonny Mccarthy, am serving as a trained medical scribe to document services personally performed by Gordy Sanchez MD, based on the provider's statements to me.      IGordy MD, was physically present and have reviewed and verified the accuracy of this note documented by Jonny Mccarthy.       12/29/2017   [facility], [place], EMERGENCY DEPARTMENT     Ender Sanchez MD  12/31/17 2000     details… soft/nontender/nondistended

## 2024-09-04 ENCOUNTER — LAB REQUISITION (OUTPATIENT)
Dept: LAB | Facility: CLINIC | Age: 29
End: 2024-09-04
Payer: COMMERCIAL

## 2024-09-04 DIAGNOSIS — J30.9 ALLERGIC RHINITIS, UNSPECIFIED: ICD-10-CM

## 2024-09-04 PROCEDURE — 82785 ASSAY OF IGE: CPT | Mod: ORL

## 2024-09-04 PROCEDURE — 36415 COLL VENOUS BLD VENIPUNCTURE: CPT | Mod: ORL

## 2024-09-04 PROCEDURE — 86003 ALLG SPEC IGE CRUDE XTRC EA: CPT | Mod: ORL

## 2024-09-05 LAB
A ALTERNATA IGE QN: <0.1 KU(A)/L
A FUMIGATUS IGE QN: <0.1 KU(A)/L
A-LACTALB IGE QN: <0.1 KU(A)/L
B-LACTOGLOB MF77 IGE QN: <0.1 KU(A)/L
BERMUDA GRASS IGE QN: <0.1 KU(A)/L
C HERBARUM IGE QN: <0.1 KU(A)/L
CASEIN IGE QN: <0.1 KU(A)/L
CAT DANDER IGG QN: <0.1 KU(A)/L
CEDAR IGE QN: <0.1 KU(A)/L
COMMON RAGWEED IGE QN: <0.1 KU(A)/L
COTTONWOOD IGE QN: <0.1 KU(A)/L
D FARINAE IGE QN: <0.1 KU(A)/L
D PTERONYSS IGE QN: <0.1 KU(A)/L
DOG DANDER+EPITH IGE QN: <0.1 KU(A)/L
GLUTEN IGE QN: <0.1 KU(A)/L
IGE SERPL-ACNC: 15 KU/L (ref 0–114)
MAPLE IGE QN: <0.1 KU(A)/L
MARSH ELDER IGE QN: <0.1 KU(A)/L
MOUSE URINE PROT IGE QN: <0.1 KU(A)/L
NETTLE IGE QN: <0.1 KU(A)/L
P NOTATUM IGE QN: <0.1 KU(A)/L
ROACH IGE QN: <0.1 KU(A)/L
SALTWORT IGE QN: <0.1 KU(A)/L
SILVER BIRCH IGE QN: <0.1 KU(A)/L
TIMOTHY IGE QN: <0.1 KU(A)/L
WHITE ASH IGE QN: <0.1 KU(A)/L
WHITE ELM IGE QN: <0.1 KU(A)/L
WHITE MULBERRY IGE QN: <0.1 KU(A)/L
WHITE OAK IGE QN: <0.1 KU(A)/L